# Patient Record
Sex: MALE | URBAN - METROPOLITAN AREA
[De-identification: names, ages, dates, MRNs, and addresses within clinical notes are randomized per-mention and may not be internally consistent; named-entity substitution may affect disease eponyms.]

---

## 2017-10-31 ENCOUNTER — THERAPY VISIT (OUTPATIENT)
Dept: PHYSICAL THERAPY | Facility: CLINIC | Age: 62
End: 2017-10-31
Payer: OTHER MISCELLANEOUS

## 2017-10-31 DIAGNOSIS — M25.512 ACUTE PAIN OF LEFT SHOULDER: Primary | ICD-10-CM

## 2017-10-31 PROCEDURE — 97161 PT EVAL LOW COMPLEX 20 MIN: CPT | Mod: GP | Performed by: PHYSICAL THERAPIST

## 2017-10-31 PROCEDURE — 97110 THERAPEUTIC EXERCISES: CPT | Mod: GP | Performed by: PHYSICAL THERAPIST

## 2017-10-31 NOTE — PROGRESS NOTES
[facility] Initial Evaluation    Subjective:    Patient is a 62 year old male presenting with rehab left shoulder hpi. The history is provided by the patient. No  was used.   Emiliano Cotter is a 62 year old male with a left shoulder (right handed) condition.  Condition occurred with:  Lifting.  Condition occurred: at work.  This is a new condition  Patient reports that he was lifting a 100# object that was 8 feet long and he was trying to leverage the farther away end with his arm and had onset of left shoulder pain. This happened on 10-4-17..    Patient reports pain:  Lateral and anterior.    Pain is described as sharp and aching and is constant and reported as 2/10 (up to 10/10 pain when gets a sharp pain).   Pain is the same all the time.  Symptoms are exacerbated by using arm overhead, using arm behind back, using arm at shoulder level, lifting and lying on extremity (2/10 pain to put shirt on over his head, 5/10 to first shelf of cupboard ) and relieved by rest and NSAID's.  Since onset symptoms are gradually improving.  Special tests:  X-ray (neg per patient).      General health as reported by patient is good.  Pertinent medical history includes:  Asthma.  Medical allergies: no.  Other surgeries include:  None reported.  Current medications:  None as reported by the patient.  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  Patient is working in normal job with restrictions.  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    Barriers include:  None as reported by the patient.    Red flags:  None as reported by the patient.                        Objective:          Flexibility/Screens:   Negative screens: Cervical                              Shoulder Evaluation:  ROM:  AROM:    Flexion:  Left:  150 with ache from 90 and above    Right:  155    Abduction:  Left: WNL with ache from 70 and above                     Extension/Internal Rotation:   Left:  T9 SP with end range ache    Right:  T 9 SP          Strength:    Flexion: Left:5/5    Pain: -    Right: 5/5      Pain:  -  Extension:  Left: 5/5     Pain:-    Right: 5/5     Pain:-  Abduction:  Left: 4-/5   Pain:++    Right: 5/5      Pain:-  Adduction:  Left: 5/5     Pain:-    Right: 5/5      Pain:-  Internal Rotation:  Left:/5     Pain:-    Right: 5/5      Pain:-  External Rotation:   Left:4+/5      Pain:+   Right:5/5      Pain:-      Horizontal Adduction:  Right:/5     Pain:-  Elbow Flexion:  Left:5/5      Pain:-    Right:/5     Pain:-  Elbow Extension:  Left:5/5      Pain:-    Right:5/5      Pain:-  Stability Testing:  normal      Special Tests:    Left shoulder positive for the following special tests:  Impingement  Left shoulder negative for the following special tests:  Rotator cuff tear and Acromioclavicular                                         General     ROS    Assessment/Plan:      Patient is a 62 year old male with left side shoulder complaints.    Patient has the following significant findings with corresponding treatment plan.                Diagnosis 1:  Shoulder strain/impingement  Pain -  manual therapy, self management, education, directional preference exercise and home program  Decreased joint mobility - manual therapy, therapeutic exercise and home program  Decreased strength - therapeutic exercise, therapeutic activities and home program  Impaired muscle performance - neuro re-education and home program  Decreased function - therapeutic activities and home program    Therapy Evaluation Codes:   1) History comprised of:   Personal factors that impact the plan of care:      None.    Comorbidity factors that impact the plan of care are:      None.     Medications impacting care: None.  2) Examination of Body Systems comprised of:   Body structures and functions that impact the plan of care:      Shoulder.   Activity limitations that impact the plan of care are:      Lifting, Laying down  and reaching.  3) Clinical presentation characteristics are:   Stable/Uncomplicated.  4) Decision-Making    Moderate complexity using standardized patient assessment instrument and/or measureable assessment of functional outcome.  Cumulative Therapy Evaluation is: Low complexity.    Previous and current functional limitations:  (See Goal Flow Sheet for this information)    Short term and Long term goals: (See Goal Flow Sheet for this information)     Communication ability:  Patient appears to be able to clearly communicate and understand verbal and written communication and follow directions correctly.  Treatment Explanation - The following has been discussed with the patient:   RX ordered/plan of care  Anticipated outcomes  Possible risks and side effects  This patient would benefit from PT intervention to resume normal activities.   Rehab potential is good.    Frequency:  1-2 X week, once daily  Duration:  for 6-8 weeks  Discharge Plan:  Achieve all LTG.  Independent in home treatment program.  Reach maximal therapeutic benefit.    Please refer to the daily flowsheet for treatment today, total treatment time and time spent performing 1:1 timed codes.

## 2017-10-31 NOTE — LETTER
[facility]  [address]  [place] [id number] [phone]    November 3, 2017    Re: Emiliano Cotter   :   1955  MRN:  [id number]   REFERRING PHYSICIAN:   Yohan Moss    [facility]    Date of Initial Evaluation:  10/31/2017  Visits:  Rxs Used: 1  Reason for Referral:  Acute pain of left shoulder    EVALUATION SUMMARY    [facility]    Subjective:  Patient is a 62 year old male presenting with rehab left shoulder hpi. The history is provided by the patient. No  was used.   Emiliano Cotter is a 62 year old male with a left shoulder (right handed) condition.  Condition occurred with:  Lifting.  Condition occurred: at work.  This is a new condition  Patient reports that he was lifting a 100# object that was 8 feet long and he was trying to leverage the farther away end with his arm and had onset of left shoulder pain. This happened on 10-4-17..    Patient reports pain:  Lateral and anterior.    Pain is described as sharp and aching and is constant and reported as 2/10 (up to 10/10 pain when gets a sharp pain).   Pain is the same all the time.  Symptoms are exacerbated by using arm overhead, using arm behind back, using arm at shoulder level, lifting and lying on extremity (2/10 pain to put shirt on over his head, 5/10 to first shelf of cupboard ) and relieved by rest and NSAID's. Since onset symptoms are gradually improving.  Special tests:  X-ray (neg per patient).  General health as reported by patient is good.  Pertinent medical history includes:  Asthma.  Medical allergies: no.  Other surgeries include:  None reported.  Current medications:  None as reported by the patient. 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  Barriers include:  None as reported by the patient.  Red flags:  None as reported by the patient.    Objective:  Flexibility/Screens:   Negative screens: Cervical        Shoulder Evaluation:  Flexion:  Left:  150 with ache from 90 and above    Right:  155  Abduction:  Left: WNL with ache from 70 and above     Extension/Internal Rotation:  Left:  T9 SP with end range ache    Right:  T 9 SP      Flexion: Left:5/5    Pain: -    Right: 5/5      Pain:  -  Extension:  Left: 5/5     Pain:-    Right: 5/5     Pain:-  Re: Emiliano Cotter   :   1955      Abduction:  Left: 4-/5   Pain:++    Right: 5/5      Pain:-  Adduction:  Left: 5/5     Pain:-    Right: 5/5      Pain:-  Internal Rotation:  Left:/5     Pain:-    Right: 5/5      Pain:-  External Rotation:   Left:4+/5      Pain:+   Right:5/5      Pain:-      Horizontal Adduction:  Right:/5     Pain:-  Elbow Flexion:  Left:5/5      Pain:-    Right:/5     Pain:-  Elbow Extension:  Left:5/5      Pain:-    Right:5/5      Pain:-  Stability Testing:  normal  Left shoulder positive for the following special tests:  Impingement  Left shoulder negative for the following special tests:  Rotator cuff tear and Acromioclavicular    Assessment/Plan:    Patient is a 62 year old male with left side shoulder complaints.    Patient has the following significant findings with corresponding treatment plan.                Diagnosis 1:  Shoulder strain/impingement  Pain -  manual therapy, self management, education, directional preference exercise and home program  Decreased joint mobility - manual therapy, therapeutic exercise and home program  Decreased strength - therapeutic exercise, therapeutic activities and home program  Impaired muscle performance - neuro re-education and home program  Decreased function - therapeutic activities and home program    Therapy Evaluation Codes:   1) History comprised of:   Personal factors that impact the plan of care:      None.     Comorbidity factors that impact the plan of care are:      None.     Medications impacting care: None.  2) Examination of Body Systems comprised of:   Body structures and functions that impact the plan of care:      Shoulder.   Activity limitations that impact the plan of care are:      Lifting, Laying down and reaching.  3) Clinical presentation characteristics are:   Stable/Uncomplicated.  4) Decision-Making    Moderate complexity using standardized patient assessment instrument and/or measureable assessment of functional outcome.  Cumulative Therapy Evaluation is: Low complexity.    Previous and current functional limitations:  (See Goal Flow Sheet for this information)    Short term and Long term goals: (See Goal Flow Sheet for this information)     Communication ability:  Patient appears to be able to clearly communicate and understand verbal and written communication and follow directions correctly.  Treatment Explanation - The following has been discussed with the patient:   RX ordered/plan of care  Re: Emiliano Cotter   : 1955          Anticipated outcomes  Possible risks and side effects  This patient would benefit from PT intervention to resume normal activities.   Rehab potential is good.    Frequency:  1-2 X week, once daily  Duration:  for 6-8 weeks  Discharge Plan:  Achieve all LTG.  Independent in home treatment program.  Reach maximal therapeutic benefit.    Thank you for your referral.        INQUIRIES  Therapist: Ekta Esquivel, PT   [facility]ICAL [facility]ER[facility]  [address] [place] [address]. [address]  [facility] [address]  Phone: [phone]  Fax: [phone]

## 2017-10-31 NOTE — MR AVS SNAPSHOT
After Visit Summary   10/31/2017    Emiliano Cotter    MRN: [id number]           Patient Information     Date Of Birth          1955        Visit Information        Provider Department      10/31/2017 2:20 PM Ekta Esquivel, PT [facility]        Today's Diagnoses     Acute pain of left shoulder    -  1       Follow-ups after your visit        Your next 10 appointments already scheduled     Nov 07, 2017  3:00 PM CST   SHOAIB Extremity with Ekta Esquivel PT   [facility] ([facility])    [address]  [facility] [address]   [phone]            Nov 09, 2017  6:30 AM CST   SHOAIB Extremity with Ekta Esquivel PT   [facility] ([facility])    [address]  [facility] [address]   [phone]            Nov 20, 2017  3:00 PM CST   SHOAIB Extremity with Ekta Esquivel PT   [facility] ([facility])    [address]  [facility] [address]   [phone]              Who to contact     If you have questions or need follow up information about today's clinic visit or your schedule please contact [facility] THERAPY directly at [phone].  Normal or non-critical lab and imaging results will be communicated to you by MyChart, letter or phone within 4 business days after the clinic has received the results. If you do not hear from us within 7 days, please contact the clinic through MyChart or phone. If you have a critical or abnormal lab result, we will notify you by phone as soon as possible.  Submit refill requests through fypio or call your pharmacy and they will forward the refill request to us. Please allow 3 business days for your refill to be completed.          Additional Information  "About Your Visit        MyChart Information     MMIT lets you send messages to your doctor, view your test results, renew your prescriptions, schedule appointments and more. To sign up, go to [email or web address] . Click on \"Log in\" on the left side of the screen, which will take you to the Welcome page. Then click on \"Sign up Now\" on the right side of the page.     You will be asked to enter the access code listed below, as well as some personal information. Please follow the directions to create your username and password.     Your access code is: [email or web address]  Expires: 2018 10:25 AM     Your access code will  in 90 days. If you need help or a new code, please call your [facility] clinic or [phone].        Care EveryWhere ID     This is your Care EveryWhere ID. This could be used by other organizations to access your [facility] medical records  [id number]         Blood Pressure from Last 3 Encounters:   No data found for BP    Weight from Last 3 Encounters:   No data found for Wt              We Performed the Following     SHOAIB Inital Eval Report     PT Eval, Low Complexity (87013)     Therapeutic Exercises        Primary Care Provider Office Phone # Fax #    Yohan Moss [phone]       [address] [address]        Equal Access to Services     CONCEPCIÓN RIZVI : Hadii aad ku hadasho Soomaali, waaxda luqadaha, qaybta kaalmada adeegyada, briana amaya . So [facility] [phone].    ATENCIÓN: Si habla español, tiene a hernandez disposición servicios gratuitos de asistencia lingüística. Llame al [phone].    We comply with applicable federal civil rights laws and [place] laws. We do not discriminate on the basis of race, color, national origin, age, disability, sex, sexual orientation, or gender identity.            Thank you!     Thank you for choosing [facility] PHYSICAL [facility]  for your care. Our goal " is always to provide you with excellent care. Hearing back from our patients is one way we can continue to improve our services. Please take a few minutes to complete the written survey that you may receive in the mail after your visit with us. Thank you!             Your Updated Medication List - Protect others around you: Learn how to safely use, store and throw away your medicines at www.disposemymeds.org.      Notice  As of 10/31/2017 11:59 PM    You have not been prescribed any medications.

## 2017-11-03 PROBLEM — M25.512 ACUTE PAIN OF LEFT SHOULDER: Status: ACTIVE | Noted: 2017-11-03

## 2017-11-07 ENCOUNTER — THERAPY VISIT (OUTPATIENT)
Dept: PHYSICAL THERAPY | Facility: CLINIC | Age: 62
End: 2017-11-07
Payer: OTHER MISCELLANEOUS

## 2017-11-07 DIAGNOSIS — M25.512 ACUTE PAIN OF LEFT SHOULDER: ICD-10-CM

## 2017-11-07 PROCEDURE — 97110 THERAPEUTIC EXERCISES: CPT | Mod: GP | Performed by: PHYSICAL THERAPIST

## 2017-11-07 PROCEDURE — 97112 NEUROMUSCULAR REEDUCATION: CPT | Mod: GP | Performed by: PHYSICAL THERAPIST

## 2017-11-07 NOTE — MR AVS SNAPSHOT
"              After Visit Summary   11/7/2017    Emiliano Cotter    MRN: [id number]           Patient Information     Date Of Birth          1955        Visit Information        Provider Department      11/7/2017 3:00 PM Ekta Esquivel, PT [facility]        Today's Diagnoses     Acute pain of left shoulder           Follow-ups after your visit        Your next 10 appointments already scheduled     Nov 09, 2017  6:30 AM CST   SHOAIB Extremity with Ekta Esquivel PT   [facility] ([facility])    [address]  [facility] [id number] [phone]            Nov 20, 2017  3:00 PM CST   SHOAIB Extremity with Ekta Esquivel PT   [facility] ([facility])    [address]  [facility] [address] [phone]              Who to contact     If you have questions or need follow up information about today's clinic visit or your schedule please contact [facility] directly at [phone].  Normal or non-critical lab and imaging results will be communicated to you by MyChart, letter or phone within 4 business days after the clinic has received the results. If you do not hear from us within 7 days, please contact the clinic through Stupeflixhart or phone. If you have a critical or abnormal lab result, we will notify you by phone as soon as possible.  Submit refill requests through twtrland or call your pharmacy and they will forward the refill request to us. Please allow 3 business days for your refill to be completed.          Additional Information About Your Visit        MyChart Information     twtrland lets you send messages to your doctor, view your test results, renew your prescriptions, schedule appointments and more. To sign up, go to [email or web address] . Click on \"Log in\" " "on the left side of the screen, which will take you to the Welcome page. Then click on \"Sign up Now\" on the right side of the page.     You will be asked to enter the access code listed below, as well as some personal information. Please follow the directions to create your username and password.     Your access code is: [email or web address]  Expires: 2018  9:25 AM     Your access code will  in 90 days. If you need help or a new code, please call your [facility] or [phone].        Care EveryWhere ID     This is your Care EveryWhere ID. This could be used by other organizations to access your [facility] medical records  [id number]         Blood Pressure from Last 3 Encounters:   No data found for BP    Weight from Last 3 Encounters:   No data found for Wt              We Performed the Following     Neuromuscular Re-Education     Therapeutic Exercises        Primary Care Provider Office Phone # Fax #    Yohan Moss [phone]       [address] [address]        Equal Access to Services     CROW RIZVI : Hadii aad ku hadasho Soomaali, waaxda luqadaha, qaybta kaalmada adeegyada, briana alford haybereket amaya . So [facility] [phone].    ATENCIÓN: Si habla español, tiene a hernandez disposición servicios gratuitos de asistencia lingüística. Llame al [phone].    We comply with applicable federal civil rights laws and [place] laws. We do not discriminate on the basis of race, color, national origin, age, disability, sex, sexual orientation, or gender identity.            Thank you!     Thank you for choosing [facility]  for your care. Our goal is always to provide you with excellent care. Hearing back from our patients is one way we can continue to improve our services. Please take a few minutes to complete the written survey that you may receive in the mail after your visit with us. Thank you!             Your " Updated Medication List - Protect others around you: Learn how to safely use, store and throw away your medicines at www.disposemymeds.org.      Notice  As of 11/7/2017  4:22 PM    You have not been prescribed any medications.

## 2017-11-07 NOTE — PROGRESS NOTES
Subjective:    HPI                    Objective:    System    Physical Exam    General     ROS    Assessment/Plan:      SUBJECTIVE  Subjective changes as noted by pt:  Patient reports that he was feeling quite a bit better, but then he tweaked his shoulder ar work today when he had to pull it back quickly from a position he was working on when his arm was at about shoulder level.      Current Pain level: 3/10   Changes in function:  Yes (See Goal flowsheet attached for changes in current functional level)     Adverse reaction to treatment or activity:  None    OBJECTIVE  Changes in objective findings:  L shoulder AROM in standing: flexion WNL with end range tightness and aching, abduction WNL with end range tightness and pain from about 90 degrees to end range, and IR/EXT WNL and no pain. No change in resisted L shoulder testing.         ASSESSMENT  Emiliano continues to require intervention to meet STG and LTG's: PT  Patient is progressing as expected.  Response to therapy has shown an improvement in  pain level and function  Progress made towards STG/LTG?  Yes (See Goal flowsheet attached for updates on achievement of STG and LTG)    PLAN  Current treatment program is being advanced to more complex exercises.    PTA/ATC plan:  N/A    Please refer to the daily flowsheet for treatment today, total treatment time and time spent performing 1:1 timed codes.

## 2017-11-09 ENCOUNTER — THERAPY VISIT (OUTPATIENT)
Dept: PHYSICAL THERAPY | Facility: CLINIC | Age: 62
End: 2017-11-09
Payer: OTHER MISCELLANEOUS

## 2017-11-09 DIAGNOSIS — M25.512 ACUTE PAIN OF LEFT SHOULDER: ICD-10-CM

## 2017-11-09 PROCEDURE — 97110 THERAPEUTIC EXERCISES: CPT | Mod: GP | Performed by: PHYSICAL THERAPIST

## 2017-11-09 PROCEDURE — 97112 NEUROMUSCULAR REEDUCATION: CPT | Mod: GP | Performed by: PHYSICAL THERAPIST

## 2017-11-20 ENCOUNTER — THERAPY VISIT (OUTPATIENT)
Dept: PHYSICAL THERAPY | Facility: CLINIC | Age: 62
End: 2017-11-20
Payer: OTHER MISCELLANEOUS

## 2017-11-20 DIAGNOSIS — M25.512 ACUTE PAIN OF LEFT SHOULDER: ICD-10-CM

## 2017-11-20 PROCEDURE — 97112 NEUROMUSCULAR REEDUCATION: CPT | Mod: GP | Performed by: PHYSICAL THERAPIST

## 2017-11-20 PROCEDURE — 97110 THERAPEUTIC EXERCISES: CPT | Mod: GP | Performed by: PHYSICAL THERAPIST

## 2017-11-20 NOTE — PROGRESS NOTES
Subjective:    HPI                    Objective:    System    Physical Exam    General     ROS    Assessment/Plan:      PROGRESS  REPORT    Progress reporting period is from 10-31-17 to 11-20-17.       SUBJECTIVE  Subjective changes noted by patient:  Patient reports 95% improvement since starting therapy. He has done some lifting of 30# from an upper shelf at work and has had no pain.        Current Pain level: 0/10.     Previous pain level was  2/10  .   Changes in function:  Yes (See Goal flowsheet attached for changes in current functional level)  Adverse reaction to treatment or activity: None    OBJECTIVE  Changes noted in objective findings:  Standing L shoulder AROM all WNL with end range tightness on full elevation and no pain with movements. Left shoulder resisted testing all strong and painfree, except with 5-/5 and very slight ache with resisted L shoulder abduction.         ASSESSMENT/PLAN  Updated problem list and treatment plan: Diagnosis 1:  L shoulder strain/impingement  Pain -  manual therapy, self management, education, directional preference exercise and home program  Decreased ROM/flexibility - manual therapy, therapeutic exercise and home program  Decreased strength - therapeutic exercise, therapeutic activities and home program  Impaired muscle performance - neuro re-education and home program  Decreased function - therapeutic activities and home program  STG/LTGs have been met or progress has been made towards goals:  Yes (See Goal flow sheet completed today.)  Assessment of Progress: The patient's condition is improving.  The patient has met all of their long term goals.  Self Management Plans:  Patient is independent in a home treatment program.  Patient is independent in self management of symptoms.    Emiliano continues to require the following intervention to meet STG and LTG's:  PT intervention is no longer required to meet STG/LTG.    Recommendations:  This patient is ready to be  discharged from therapy and continue their home treatment program.    Please refer to the daily flowsheet for treatment today, total treatment time and time spent performing 1:1 timed codes.

## 2017-11-20 NOTE — MR AVS SNAPSHOT
"              After Visit Summary   2017    Emiliano Cotter    MRN: [id number]           Patient Information     Date Of Birth          1955        Visit Information        Provider Department      2017 3:00 PM Ekta Esquivel PT [facility] Physical Therapy        Today's Diagnoses     Acute pain of left shoulder           Follow-ups after your visit        Who to contact     If you have questions or need follow up information about today's clinic visit or your schedule please contact [facility] directly at [phone].  Normal or non-critical lab and imaging results will be communicated to you by [employer]et[employer], letter or phone within 4 business days after the clinic has received the results. If you do not hear from us within 7 days, please contact the clinic through [employer]abet[employer] or phone. If you have a critical or abnormal lab result, we will notify you by phone as soon as possible.  Submit refill requests through [employer] or call your pharmacy and they will forward the refill request to us. Please allow 3 business days for your refill to be completed.          Additional Information About Your Visit        MyChart Information     [employer] lets you send messages to your doctor, view your test results, renew your prescriptions, schedule appointments and more. To sign up, go to [email or web address] . Click on \"Log in\" on the left side of the screen, which will take you to the Welcome page. Then click on \"Sign up Now\" on the right side of the page.     You will be asked to enter the access code listed below, as well as some personal information. Please follow the directions to create your username and password.     Your access code is: [email or web address]  Expires: 2018  9:25 AM     Your access code will  in 90 days. If you need help or a new code, please call your [place] clinic or [phone].        Care EveryWhere " ID     This is your Care EveryWhere ID. This could be used by other organizations to access your [place] medical records  [id number]         Blood Pressure from Last 3 Encounters:   No data found for BP    Weight from Last 3 Encounters:   No data found for Wt              We Performed the Following     SHOAIB Progress Notes Report     Neuromuscular Re-Education     Therapeutic Exercises        Primary Care Provider Office Phone # Fax #    Yohan Moss [phone]       [address] [address]        Equal Access to Services     CONCEPCIÓN RIZVI : Hadii aad ku hadasho Soomaali, waaxda luqadaha, qaybta kaalmada adeegyada, waxay idiin hayaan adeeg kharash la'bereket . So [facility] [phone].    ATENCIÓN: Si habla español, tiene a hernandez disposición servicios gratuitos de asistencia lingüística. [facility] [phone].    We comply with applicable federal civil rights laws and [place] laws. We do not discriminate on the basis of race, color, national origin, age, disability, sex, sexual orientation, or gender identity.            Thank you!     Thank you for choosing [facility]  for your care. Our goal is always to provide you with excellent care. Hearing back from our patients is one way we can continue to improve our services. Please take a few minutes to complete the written survey that you may receive in the mail after your visit with us. Thank you!             Your Updated Medication List - Protect others around you: Learn how to safely use, store and throw away your medicines at www.disposemymeds.org.      Notice  As of 11/20/2017 11:59 PM    You have not been prescribed any medications.

## 2018-10-16 ENCOUNTER — THERAPY VISIT (OUTPATIENT)
Dept: PHYSICAL THERAPY | Facility: CLINIC | Age: 63
End: 2018-10-16
Payer: OTHER MISCELLANEOUS

## 2018-10-16 DIAGNOSIS — M25.512 SHOULDER PAIN, LEFT: Primary | ICD-10-CM

## 2018-10-16 PROCEDURE — 97162 PT EVAL MOD COMPLEX 30 MIN: CPT | Mod: GP | Performed by: PHYSICAL THERAPIST

## 2018-10-16 PROCEDURE — 97110 THERAPEUTIC EXERCISES: CPT | Mod: GP | Performed by: PHYSICAL THERAPIST

## 2018-10-16 NOTE — PROGRESS NOTES
[place] [facility] Initial Evaluation  Subjective:  Patient is a 62 year old male presenting with rehab left shoulder hpi. The history is provided by the patient. No  was used.   Emiliano Cotter is a 62 year old male with a left shoulder (right handed) condition.  Condition occurred with:  Contact with object.  Condition occurred: at work.  This is a new condition  Patient reports that on 7-7-18, he was at work and a heat exchanger on a hoist was swinging around and he put his left arm out to stop it and he had immediate onset of left shoulder pain. He continued to work with pain to get a project out. He went to the doctor in September of 2018 and had a MRI last week which he states shows some RCT. He had a similar pain in the left shoulder from lifting at work which resolved with therapy. MD order from 10-15-18..    Patient reports pain:  Upper arm.    Pain is described as aching, sharp and shooting and is constant (constant ache and intermitent sharp) and reported as 3/10 (up to 9/10 pain when bad).  Associated symptoms:  Loss of motion/stiffness. Pain is the same all the time (pain based more on activity).  Exacerbated by: 7/10 pain to put shirt on over his head, 4/10 pain to open the door, can't drive his boat,  4/10 pain to reach first shelf of cupboard, 3/10 pain to tuck shirt in behind his back, 3/10 pain to keyboard. and relieved by NSAID's, heat and rest.  Since onset symptoms are unchanged.  Special tests:  MRI (tendinopathy long head of biceps, partail tear of infraspin and partial chronic tear of supra and labrum, OA of GH and AC joints).      General health as reported by patient is excellent.  Pertinent medical history includes:  Asthma.  Medical allergies: no.  Other surgeries include:  Other (appendectomy, cyst).  Current medications:  Other (flow vent).  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  Patient is currently not working due to present treatment problem.  Primary  job tasks include:  Lifting, prolonged standing, other and repetitive tasks (pushing/pulling, arrying).    Barriers include:  None as reported by the patient.    Red flags:  None as reported by the patient.                        Objective:  System                   Shoulder Evaluation:  ROM:  AROM:    Flexion:  Left:  135 with ache ffrom 80 and above    Right:  150    Abduction:  Left: 122 with pain from 40 and above   Right:  148                  Extension/Internal Rotation:  Left:  L 2 SP with ache    Right:  T 10 SP          Strength:    Flexion: Left:5/5    Pain: -    Right: 5/5      Pain:  -  Extension:  Left: 5/5     Pain:-    Right: 5/5     Pain:-  Abduction:  Left: 4-/5   Pain:++    Right: 5/5      Pain:-  Adduction:  Left: 5/5     Pain:-    Right:/5   Strong/pain free    Pain:-  Internal Rotation:  Left:4+/5      Pain:+    Right: 5/5      Pain:-  External Rotation:   Left:4+/5      Pain:-   Right:5/5      Pain:-      Horizontal Adduction:  Right:/5     Pain:-  Elbow Flexion:  Left:4+/5      Pain:+    Right:5/5      Pain:-  Elbow Extension:  Left:5/5      Pain:-    Right:5/5      Pain:-  Stability Testing:  normal      Special Tests:    Left shoulder positive for the following special tests:  Impingement    Palpation:  not assessed                                         General     ROS    Assessment/Plan:    Patient is a 62 year old male with left side shoulder complaints.    Patient has the following significant findings with corresponding treatment plan.                Diagnosis 1:  Shoulder pain/impingement  Pain -  hot/cold therapy, manual therapy, self management, education, directional preference exercise and home program  Decreased ROM/flexibility - manual therapy, therapeutic exercise and home program  Decreased strength - therapeutic exercise, therapeutic activities and home program  Impaired muscle performance - neuro re-education and home program  Decreased function - therapeutic activities and  home program    Therapy Evaluation Codes:   1) History comprised of:   Personal factors that impact the plan of care:      Past/current experiences.    Comorbidity factors that impact the plan of care are:      None.     Medications impacting care: None.  2) Examination of Body Systems comprised of:   Body structures and functions that impact the plan of care:      Shoulder.   Activity limitations that impact the plan of care are:      Bathing, Driving, Dressing, Lifting, Working, Sleeping, Laying down and reaching.  3) Clinical presentation characteristics are:   Evolving/Changing.  4) Decision-Making    Moderate complexity using standardized patient assessment instrument and/or measureable assessment of functional outcome.  Cumulative Therapy Evaluation is: Moderate complexity.    Previous and current functional limitations:  (See Goal Flow Sheet for this information)    Short term and Long term goals: (See Goal Flow Sheet for this information)     Communication ability:  Patient appears to be able to clearly communicate and understand verbal and written communication and follow directions correctly.  Treatment Explanation - The following has been discussed with the patient:   RX ordered/plan of care  Anticipated outcomes  Possible risks and side effects  This patient would benefit from PT intervention to resume normal activities.   Rehab potential is good.    Frequency:  1 X week, once daily  Duration:  for 10 weeks  Discharge Plan:  Achieve all LTG.  Independent in home treatment program.  Reach maximal therapeutic benefit.    Please refer to the daily flowsheet for treatment today, total treatment time and time spent performing 1:1 timed codes.

## 2018-10-16 NOTE — LETTER
[facility]  [address]  [facility] [address]  [phone]    2018    Re: Emiliano Cotter   :   1955  MRN:  [id number]   REFERRING PHYSICIAN:   Yohan Moss  [facility]  Date of Initial Evaluation:  10/16/2018  Visits:  Rxs Used: 1  Reason for Referral:  Shoulder pain, left    EVALUATION SUMMARY  [facility] Evaluation  Subjective:  Patient is a 62 year old male presenting with rehab left shoulder hpi. The history is provided by the patient. No  was used.   Emiliano Cotter is a 62 year old male with a left shoulder (right handed) condition.  Condition occurred with:  Contact with object.  Condition occurred: at work.  This is a new condition  Patient reports that on 18, he was at work and a heat exchanger on a hoist was swinging around and he put his left arm out to stop it and he had immediate onset of left shoulder pain. He continued to work with pain to get a project out. He went to the doctor in 2018 and had a MRI last week which he states shows some RCT. He had a similar pain in the left shoulder from lifting at work which resolved with therapy. MD order from 10-15-18..    Patient reports pain:  Upper arm.    Pain is described as aching, sharp and shooting and is constant (constant ache and intermitent sharp) and reported as 3/10 (up to 9/10 pain when bad).  Associated symptoms:  Loss of motion/stiffness. Pain is the same all the time (pain based more on activity).  Exacerbated by: 7/10 pain to put shirt on over his head, 4/10 pain to open the door, can't drive his boat,  4/10 pain to reach first shelf of cupboard, 3/10 pain to tuck shirt in behind his back, 3/10 pain to keyboard. and relieved by NSAID's, heat and rest.  Since onset symptoms are unchanged.  Special tests:  MRI (tendinopathy long head of biceps, partail tear of infraspin and  partial chronic tear of supra and labrum, OA of GH and AC joints).      General health as reported by patient is excellent.  Pertinent medical history includes:  Asthma.  Medical allergies: no.  Other surgeries include:  Other (appendectomy, cyst).  Current medications:  Other (flow vent).  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  Patient is currently not working due to present treatment problem.  Primary job tasks include:  Lifting, prolonged standing, other and repetitive tasks (pushing/pulling, arrying).  Barriers include:  None as reported by the patient.  Red flags:  None as reported by the patient.  Objective:  System       Shoulder Evaluation:  ROM:  AROM:    Flexion:  Left:  135 with ache ffrom 80 and above    Right:  150  Abduction:  Left: 122 with pain from 40 and above   Right:  148  Extension/Internal Rotation:  Left:  L 2 SP with ache    Right:  T 10 SP      Strength:    Flexion: Left:5/5    Pain: -    Right: 5/5      Pain:  -  Extension:  Left: 5/5     Pain:-    Right: 5/5     Pain:-  Abduction:  Left: 4-/5   Pain:++    Right: 5/5      Pain:-  Adduction:  Left: 5/5     Pain:-    Right:/5   Strong/pain free    Pain:-  Internal Rotation:  Left:4+/5      Pain:+    Right: 5/5      Pain:-  External Rotation:   Left:4+/5      Pain:-   Right:5/5      Pain:-      Horizontal Adduction:  Right:/5     Pain:-  Elbow Flexion:  Left:4+/5      Pain:+    Right:5/5      Pain:-  Elbow Extension:  Left:5/5      Pain:-    Right:5/5      Pain:-  Stability Testing:  normal    Special Tests:    Left shoulder positive for the following special tests:  Impingement  Palpation:  not assessed    Assessment/Plan:    Patient is a 62 year old male with left side shoulder complaints.    Patient has the following significant findings with corresponding treatment plan.                Diagnosis 1:  Shoulder pain/impingement  Pain -  hot/cold therapy, manual therapy, self management, education, directional preference exercise and home  program  Decreased ROM/flexibility - manual therapy, therapeutic exercise and home program  Decreased strength - therapeutic exercise, therapeutic activities and home program  Impaired muscle performance - neuro re-education and home program  Decreased function - therapeutic activities and home program    Therapy Evaluation Codes:   1) History comprised of:   Personal factors that impact the plan of care:      Past/current experiences.    Comorbidity factors that impact the plan of care are:      None.     Medications impacting care: None.  2) Examination of Body Systems comprised of:   Body structures and functions that impact the plan of care:      Shoulder.   Activity limitations that impact the plan of care are:      Bathing, Driving, Dressing, Lifting, Working, Sleeping, Laying down and reaching.  3) Clinical presentation characteristics are:   Evolving/Changing.  4) Decision-Making    Moderate complexity using standardized patient assessment instrument and/or measureable assessment of functional outcome.  Cumulative Therapy Evaluation is: Moderate complexity.    Previous and current functional limitations:  (See Goal Flow Sheet for this information)    Short term and Long term goals: (See Goal Flow Sheet for this information)     Communication ability:  Patient appears to be able to clearly communicate and understand verbal and written communication and follow directions correctly.  Treatment Explanation - The following has been discussed with the patient:   RX ordered/plan of care  Anticipated outcomes  Possible risks and side effects  This patient would benefit from PT intervention to resume normal activities.   Rehab potential is good.    Frequency:  1 X week, once daily  Duration:  for 10 weeks  Discharge Plan:  Achieve all LTG.  Independent in home treatment program.  Reach maximal therapeutic benefit.    Please refer to the daily flowsheet for treatment today, total treatment time and time spent  performing 1:1 timed codes.     Thank you for your referral.    INQUIRIES  Therapist:Ekta Esquivel PT  [facility]  [address]  [facility] [address]  Phone: [phone]  Fax: [phone]

## 2018-10-16 NOTE — MR AVS SNAPSHOT
After Visit Summary   10/16/2018    Emiliano Cotter    MRN: [id number]           Patient Information     Date Of Birth          1955        Visit Information        Provider Department      10/16/2018 8:50 AM Ekta Esquivel, PT [facility]        Today's Diagnoses     Shoulder pain, left    -  1       Follow-ups after your visit        Your next 10 appointments already scheduled     Oct 23, 2018  8:10 AM CDT   [facility]OA[facility] Extremity with Ekta Esquivel PT   [facility] ([facility] [place]  )    [address] [facility] [address]   [phone]            Oct 30, 2018  8:10 AM CDT   [facility] Extremity with Ekta Esquivel PT   [facility] ([facility] [place]  )    [address] [facility] [address]   [phone]            Nov 06, 2018  8:10 AM CST   [facility] Extremity with Ekta Esquivel PT   [facility] ([facility] [place]  )    [address] [facility] [address]   [phone]            Nov 13, 2018  8:10 AM CST   [facility] Extremity with Ekta Esquivel PT   [facility] ([facility] [place]  )    [address] [facility] [address]   [phone]              Who to contact     If you have questions or need follow up information about today's clinic visit or your schedule please contact [facility] directly at [phone].  Normal or non-critical lab and imaging results will be communicated to you by MyChart, letter or phone within 4 business days after the clinic has received the results. If you do not hear from us within 7 days, please contact the clinic through MyChart or phone. If you have a critical or abnormal lab result, we will notify you by phone as soon as possible.  Submit refill requests through [employer] or call your pharmacy and they will forward the refill  "request to us. Please allow 3 business days for your refill to be completed.          Additional Information About Your Visit        MyChart Information     [employer] lets you send messages to your doctor, view your test results, renew your prescriptions, schedule appointments and more. To sign up, go to [email or web address] . Click on \"Log in\" on the left side of the screen, which will take you to the Welcome page. Then click on \"Sign up Now\" on the right side of the page.     You will be asked to enter the access code listed below, as well as some personal information. Please follow the directions to create your username and password.     Your access code is: [email or web address]  Expires: 2019  6:01 PM     Your access code will  in 90 days. If you need help or a new code, please call your [place] clinic or [phone].        Care EveryWhere ID     This is your Care EveryWhere ID. This could be used by other organizations to access your [place] medical records  [id number]         Blood Pressure from Last 3 Encounters:   No data found for BP    Weight from Last 3 Encounters:   No data found for Wt              We Performed the Following     SHOAIB Inital Eval Report     PT Eval, Moderate Complexity (85244)     Therapeutic Exercises        Primary Care Provider Office Phone # Fax #    Yohan Moss [phone]       [address] [address]        Equal Access to Services     [facility]: Hadii aad ku hadasho Soomaali, waaxda luqadaha, qaybta kaalmada adeegyada, briana alford hayjazieln madai amaya . So [facility] [phone].    ATENCIÓN: Si habla español, tiene a hernandez disposición servicios gratuitos de asistencia lingüística. Llame al [phone].    We comply with applicable federal civil rights laws and [place] laws. We do not discriminate on the basis of race, color, national origin, age, disability, sex, sexual orientation, or gender identity.            Thank you!  "    Thank you for choosing [facility]  for your care. Our goal is always to provide you with excellent care. Hearing back from our patients is one way we can continue to improve our services. Please take a few minutes to complete the written survey that you may receive in the mail after your visit with us. Thank you!             Your Updated Medication List - Protect others around you: Learn how to safely use, store and throw away your medicines at www.disposemymeds.org.      Notice  As of 10/16/2018  6:01 PM    You have not been prescribed any medications.

## 2018-10-23 ENCOUNTER — THERAPY VISIT (OUTPATIENT)
Dept: PHYSICAL THERAPY | Facility: CLINIC | Age: 63
End: 2018-10-23
Payer: OTHER MISCELLANEOUS

## 2018-10-23 DIAGNOSIS — M25.512 SHOULDER PAIN, LEFT: ICD-10-CM

## 2018-10-23 PROCEDURE — 97112 NEUROMUSCULAR REEDUCATION: CPT | Mod: GP | Performed by: PHYSICAL THERAPIST

## 2018-10-23 PROCEDURE — 97110 THERAPEUTIC EXERCISES: CPT | Mod: GP | Performed by: PHYSICAL THERAPIST

## 2018-10-23 NOTE — LETTER
[facility]  [address]  [facility] [address]  [phone]    2018    Re: Emiliano Cotter   :   1955  MRN:  [id number]   REFERRING PHYSICIAN:   Yohan Moss    [facility]    Date of Initial Evaluation:10/16/2018  Visits:  Rxs Used: 2  Reason for Referral:  Shoulder pain, left    EVALUATION SUMMARY  PROGRESS  REPORT  Progress reporting period is from 10-16-18- to 10-23-18.       SUBJECTIVE  Subjective changes noted by patient:  Patient reports that he is feeling better. He is having less clicking and notes improved shoulder ROM. He is taking a lot less ibuprofen.        Current Pain level: 1/10.     Previous pain level was  3/10  .   Changes in function:  Yes (See Goal flowsheet attached for changes in current functional level)  Adverse reaction to treatment or activity: None    OBJECTIVE  Changes noted in objective findings:  Standing L shoulder AROM: flexion with end range tightness and pain from about 140 to end range and abduction to 130 with pain from 90 to end range. Pain and weakness with resisted L shoulder abduction>ER.  ASSESSMENT/PLAN  Updated problem list and treatment plan: Diagnosis 1: shoulder pain/impingement Pain -  hot/cold therapy, manual therapy, self management, education, directional preference exercise and home program  Decreased ROM/flexibility - manual therapy, therapeutic exercise and home program  Decreased joint mobility - manual therapy, therapeutic exercise and home program  Decreased strength - therapeutic exercise, therapeutic activities and home program  Impaired muscle performance - neuro re-education and home program  Decreased function - therapeutic activities and home program  STG/LTGs have been met or progress has been made towards goals:  Yes (See Goal flow sheet completed today.)  Assessment of Progress: The patient's condition is improving.  Patient is meeting short term  goals and is progressing towards long term goals.  Self Management Plans:  Patient has been instructed in a home treatment program.  Patient  has been instructed in self management of symptoms.  Emiliano continues to require the following intervention to meet STG and LTG's:  PT  Recommendations:  This patient would benefit from continued therapy.     Frequency:  1 X week, once daily  Duration:  for 6 weeks    Please refer to the daily flowsheet for treatment today, total treatment time and time spent performing 1:1 timed codes.  Thank you for your referral.    INQUIRIES  Therapist: Ekta Esquivel, PT  [facility]  [address]  [facility] [address]  Phone: [phone]  Fax: [phone]

## 2018-10-23 NOTE — PROGRESS NOTES
Subjective:  HPI                    Objective:  System    Physical Exam    General     ROS    Assessment/Plan:    PROGRESS  REPORT    Progress reporting period is from 10-16-18- to 10-23-18.       SUBJECTIVE  Subjective changes noted by patient:  Patient reports that he is feeling better. He is having less clicking and notes improved shoulder ROM. He is taking a lot less ibuprofen.        Current Pain level: 1/10.     Previous pain level was  3/10  .   Changes in function:  Yes (See Goal flowsheet attached for changes in current functional level)  Adverse reaction to treatment or activity: None    OBJECTIVE  Changes noted in objective findings:  Standing L shoulder AROM: flexion with end range tightness and pain from about 140 to end range and abduction to 130 with pain from 90 to end range. Pain and weakness with resisted L shoulder abduction>ER.        ASSESSMENT/PLAN  Updated problem list and treatment plan: Diagnosis 1: shoulder pain/impingement Pain -  hot/cold therapy, manual therapy, self management, education, directional preference exercise and home program  Decreased ROM/flexibility - manual therapy, therapeutic exercise and home program  Decreased joint mobility - manual therapy, therapeutic exercise and home program  Decreased strength - therapeutic exercise, therapeutic activities and home program  Impaired muscle performance - neuro re-education and home program  Decreased function - therapeutic activities and home program  STG/LTGs have been met or progress has been made towards goals:  Yes (See Goal flow sheet completed today.)  Assessment of Progress: The patient's condition is improving.  Patient is meeting short term goals and is progressing towards long term goals.  Self Management Plans:  Patient has been instructed in a home treatment program.  Patient  has been instructed in self management of symptoms.    Emiliano continues to require the following intervention to meet STG and LTG's:   PT    Recommendations:  This patient would benefit from continued therapy.     Frequency:  1 X week, once daily  Duration:  for 6 weeks        Please refer to the daily flowsheet for treatment today, total treatment time and time spent performing 1:1 timed codes.

## 2018-10-23 NOTE — PROGRESS NOTES
"Subjective:  HPI                    Objective:  System    Physical Exam    General     ROS    Assessment/Plan:    SUBJECTIVE  Subjective changes as noted by pt:        Current Pain level:   Changes in function:  Yes (See Goal flowsheet attached for changes in current functional level)     Adverse reaction to treatment or activity:  None    OBJECTIVE  Changes in objective findings:       ASSESSMENT  Emiliano continues to require intervention to meet STG and LTG's: {INTERVENTION REHAB:099906}  {ASSESSMENT THERAPY:097850}  {Response to PT:965853}  Progress made towards STG/LTG?  {Changes in STG/LT}    PLAN  {Response to PT:253202}    PTA/ATC plan:  {PLAN PTA:442242::\"N/A\"}    Please refer to the daily flowsheet for treatment today, total treatment time and time spent performing 1:1 timed codes.          "

## 2018-10-23 NOTE — MR AVS SNAPSHOT
After Visit Summary   10/23/2018    Emiliano Cotter    MRN: [id number]           Patient Information     Date Of Birth          1955        Visit Information        Provider Department      10/23/2018 8:10 AM Ekta Esquivel, PT [facility]        Today's Diagnoses     Shoulder pain, left           Follow-ups after your visit        Your next 10 appointments already scheduled     Oct 30, 2018  8:10 AM CDT   SHOAIB Extremity with Ekta Esquivel PT   [facility] ([facility] [place]  )    [address]  [facility] [address]   [phone]            Nov 06, 2018  8:10 AM CST   SHOAIB Extremity with Ekta Esquivel PT   [facility] ([facility] [place]  )    [address]  [facility] [address]   [phone]            Nov 13, 2018  8:10 AM CST   SHOAIB Extremity with Ekta Esquivel PT   [facility] ([facility] [place]  )    [address]  [facility] [address]   [phone]              Who to contact     If you have questions or need follow up information about today's clinic visit or your schedule please contact [facility] directly at [phone].  Normal or non-critical lab and imaging results will be communicated to you by TYMRhart, letter or phone within 4 business days after the clinic has received the results. If you do not hear from us within 7 days, please contact the clinic through TYMRhart or phone. If you have a critical or abnormal lab result, we will notify you by phone as soon as possible.  Submit refill requests through [employer] or call your pharmacy and they will forward the refill request to us. Please allow 3 business days for your refill to be completed.          Additional Information About Your Visit        City[employer] Information     [employer] lets you send messages to your doctor, view your test  "results, renew your prescriptions, schedule appointments and more. To sign up, go to [email or web address] . Click on \"Log in\" on the left side of the screen, which will take you to the Welcome page. Then click on \"Sign up Now\" on the right side of the page.     You will be asked to enter the access code listed below, as well as some personal information. Please follow the directions to create your username and password.     Your access code is: [email or web address]  Expires: 2019  6:01 PM     Your access code will  in 90 days. If you need help or a new code, please call your [place] clinic or [phone].        Care EveryWhere ID     This is your Care EveryWhere ID. This could be used by other organizations to access your [place] medical records  [id number]         Blood Pressure from Last 3 Encounters:   No data found for BP    Weight from Last 3 Encounters:   No data found for Wt              We Performed the Following     SHOAIB Progress Notes Report     Neuromuscular Re-Education     Therapeutic Exercises        Primary Care Provider Office Phone # Fax #    Yohan Moss [phone]       [address] [address]        Equal Access to Services     CONCEPCIÓN RIZVI : Hadii dennys ku hadasho Sosongali, waaxda luqadaha, qaybta kaalmada adeegyada, briana amaya . So [facility] [phone].    ATENCIÓN: Si habla español, tiene a hernandez disposición servicios gratuitos de asistencia lingüística. Llame al [phone].    We comply with applicable federal civil rights laws and [place] laws. We do not discriminate on the basis of race, color, national origin, age, disability, sex, sexual orientation, or gender identity.            Thank you!     Thank you for choosing [facility]  for your care. Our goal is always to provide you with excellent care. Hearing back from our patients is one way we can continue to improve our services. " Please take a few minutes to complete the written survey that you may receive in the mail after your visit with us. Thank you!             Your Updated Medication List - Protect others around you: Learn how to safely use, store and throw away your medicines at www.disposemymeds.org.      Notice  As of 10/23/2018  9:47 AM    You have not been prescribed any medications.

## 2018-10-30 ENCOUNTER — THERAPY VISIT (OUTPATIENT)
Dept: PHYSICAL THERAPY | Facility: CLINIC | Age: 63
End: 2018-10-30
Payer: OTHER MISCELLANEOUS

## 2018-10-30 DIAGNOSIS — M25.512 SHOULDER PAIN, LEFT: ICD-10-CM

## 2018-10-30 PROCEDURE — 97110 THERAPEUTIC EXERCISES: CPT | Mod: GP | Performed by: PHYSICAL THERAPIST

## 2018-10-30 PROCEDURE — 97112 NEUROMUSCULAR REEDUCATION: CPT | Mod: GP | Performed by: PHYSICAL THERAPIST

## 2018-10-30 NOTE — PROGRESS NOTES
Subjective:  HPI                    Objective:  System    Physical Exam    General     ROS    Assessment/Plan:    SUBJECTIVE  Subjective changes as noted by pt:  Patient reports that he had been doing well overall until Sunday. He was just sitting at his chair with his arm resting on the armrest and reached back with that arm to get something and felt a lot of clicking and pain into the shoulder. The shoulder has been much more painful since that time. He has improved slowly since Sunday though.      Current Pain level: 2/10   Changes in function:  Yes (See Goal flowsheet attached for changes in current functional level)     Adverse reaction to treatment or activity:  None    OBJECTIVE  Changes in objective findings:  Standing L shoulder AROM: flexion and abduction to 140 with pain from 90 and above. Pain and weakness with resisted abduction>>ER>flexion. Decreased lori and improved ROM following repeated shld extension. Tolerated exercises without increased pain.         ASSESSMENT  Emiliano continues to require intervention to meet STG and LTG's: PT  Patient has experienced an exacerbation of symptoms.  Response to therapy has shown a worsening of  pain level and function  Progress made towards STG/LTG?  Yes (See Goal flowsheet attached for updates on achievement of STG and LTG)    PLAN  Continue current treatment plan until patient demonstrates readiness to progress to higher level exercises.    PTA/ATC plan:  N/A    Please refer to the daily flowsheet for treatment today, total treatment time and time spent performing 1:1 timed codes.

## 2018-10-30 NOTE — MR AVS SNAPSHOT
"              After Visit Summary   10/30/2018    Emiliano Cotter    MRN: [id number]           Patient Information     Date Of Birth          1955        Visit Information        Provider Department      10/30/2018 8:10 AM Ekta Esquivel PT [facility]        Today's Diagnoses     Shoulder pain, left           Follow-ups after your visit        Your next 10 appointments already scheduled     Nov 06, 2018  8:10 AM CST   SHOAIB Extremity with Ekta Esquivel PT   [facility] ([facility]  )    [address]  [facility] [address]   [phone]            Nov 12, 2018  7:00 AM CST   SHOAIB Extremity with Ekta Esquivel PT   [facility] ([facility]  )    [address]  [facility] [id number] [phone]              Who to contact     If you have questions or need follow up information about today's clinic visit or your schedule please contact [facility] directly at [phone].  Normal or non-critical lab and imaging results will be communicated to you by T[employer] Surf Internationalhart, letter or phone within 4 business days after the clinic has received the results. If you do not hear from us within 7 days, please contact the clinic through T[employer] or phone. If you have a critical or abnormal lab result, we will notify you by phone as soon as possible.  Submit refill requests through [employer] or call your pharmacy and they will forward the refill request to us. Please allow 3 business days for your refill to be completed.          Additional Information About Your Visit        [employer] Information     [employer] lets you send messages to your doctor, view your test results, renew your prescriptions, schedule appointments and more. To sign up, go to [email or web address] . Click on \"Log in\" on the left side of the screen, which will take you to the Welcome page. Then " "click on \"Sign up Now\" on the right side of the page.     You will be asked to enter the access code listed below, as well as some personal information. Please follow the directions to create your username and password.     Your access code is: [email or web address]  Expires: 2019  6:01 PM     Your access code will  in 90 days. If you need help or a new code, please call your [place] clinic or [phone].        Care EveryWhere ID     This is your Care EveryWhere ID. This could be used by other organizations to access your [place] medical records  [id number]         Blood Pressure from Last 3 Encounters:   No data found for BP    Weight from Last 3 Encounters:   No data found for Wt              We Performed the Following     Neuromuscular Re-Education     Therapeutic Exercises        Primary Care Provider Office Phone # Fax #    Yohan Moss [phone]       [address] [address]        Equal Access to Services     [facility]: Hadii aad ku hadasho Soomaali, waaxda luqadaha, qaybta kaalmada adeegyada, waxay idiin hayaan madai amaya . So [facility] [phone].    ATENCIÓN: Si davidla halley, tiene a hernandez disposición servicios gratuitos de asistencia lingüística. Llame al [phone].    We comply with applicable federal civil rights laws and [place] laws. We do not discriminate on the basis of race, color, national origin, age, disability, sex, sexual orientation, or gender identity.            Thank you!     Thank you for choosing [facility]  for your care. Our goal is always to provide you with excellent care. Hearing back from our patients is one way we can continue to improve our services. Please take a few minutes to complete the written survey that you may receive in the mail after your visit with us. Thank you!             Your Updated Medication List - Protect others around you: Learn how to safely use, store and throw " away your medicines at www.disposemymeds.org.      Notice  As of 10/30/2018  4:31 PM    You have not been prescribed any medications.

## 2018-11-06 ENCOUNTER — THERAPY VISIT (OUTPATIENT)
Dept: PHYSICAL THERAPY | Facility: CLINIC | Age: 63
End: 2018-11-06
Payer: OTHER MISCELLANEOUS

## 2018-11-06 DIAGNOSIS — M25.512 SHOULDER PAIN, LEFT: ICD-10-CM

## 2018-11-06 PROCEDURE — 97112 NEUROMUSCULAR REEDUCATION: CPT | Mod: GP | Performed by: PHYSICAL THERAPIST

## 2018-11-06 PROCEDURE — 97110 THERAPEUTIC EXERCISES: CPT | Mod: GP | Performed by: PHYSICAL THERAPIST

## 2018-11-06 NOTE — PROGRESS NOTES
Subjective:  HPI                    Objective:  System    Physical Exam    General     ROS    Assessment/Plan:    SUBJECTIVE  Subjective changes as noted by pt:  Patient reports that he has improved by about 85% since starting therapy. He did some raking without any increase in pain over the weekend.       Current Pain level: 0/10   Changes in function:  Yes (See Goal flowsheet attached for changes in current functional level)     Adverse reaction to treatment or activity:  None    OBJECTIVE  Changes in objective findings:  Standing L shoulder AROM: WNL with end range ache on flexion, abduction, and IR/EXT. Pain and weakness with resisted abduction>>IR & ER>elbow flexion.         ASSESSMENT  Emiliano continues to require intervention to meet STG and LTG's: PT  Patient's symptoms are resolving.  Patient is progressing as expected.  Response to therapy has shown an improvement in  pain level, ROM , strength and function  Progress made towards STG/LTG?  Yes (See Goal flowsheet attached for updates on achievement of STG and LTG)    PLAN  Current treatment program is being advanced to more complex exercises.    PTA/ATC plan:  N/A    Please refer to the daily flowsheet for treatment today, total treatment time and time spent performing 1:1 timed codes.

## 2018-11-06 NOTE — MR AVS SNAPSHOT
"              After Visit Summary   11/6/2018    Emiliano Cotter    MRN: [id number]           Patient Information     Date Of Birth          1955        Visit Information        Provider Department      11/6/2018 8:10 AM Ekta Esquivel PT [facility]        Today's Diagnoses     Shoulder pain, left           Follow-ups after your visit        Your next 10 appointments already scheduled     Nov 12, 2018  7:00 AM CST   SHOAIB Extremity with Ekta Esquivel PT   [facility]    [address]  [facility] [address] [phone]              Who to contact     If you have questions or need follow up information about today's clinic visit or your schedule please contact [facility] directly at [phone].  Normal or non-critical lab and imaging results will be communicated to you by MyChart, letter or phone within 4 business days after the clinic has received the results. If you do not hear from us within 7 days, please contact the clinic through [employer] or phone. If you have a critical or abnormal lab result, we will notify you by phone as soon as possible.  Submit refill requests through [employer] or call your pharmacy and they will forward the refill request to us. Please allow 3 business days for your refill to be completed.          Additional Information About Your Visit        MyChart Information     Style[employer] lets you send messages to your doctor, view your test results, renew your prescriptions, schedule appointments and more. To sign up, go to [email or web address] . Click on \"Log in\" on the left side of the screen, which will take you to the Welcome page. Then click on \"Sign up Now\" on the right side of the page.     You will be asked to enter the access code listed below, as well as some personal information. Please follow the directions to create your username and " password.     Your access code is: [email or web address]  Expires: 2019  5:01 PM     Your access code will  in 90 days. If you need help or a new code, please call your [place] clinic or [phone].        Care EveryWhere ID     This is your Care EveryWhere ID. This could be used by other organizations to access your [place] medical records  [id number]         Blood Pressure from Last 3 Encounters:   No data found for BP    Weight from Last 3 Encounters:   No data found for Wt              We Performed the Following     Neuromuscular Re-Education     Therapeutic Exercises        Primary Care Provider Office Phone # Fax #    Yohan Moss [phone]       [address] [address]        Equal Access to Services     CONCEPCIÓN RIZVI : Hadii dennys sarah hadasho Sosongali, waaxda luqadaha, qaybta kaalmada adeegyada, waxay amberin hayjazieln madai amaya . So [facility] [phone].    ATENCIÓN: Si habla español, tiene a hernandez disposición servicios gratuitos de asistencia lingüística. Llame al [phone].    We comply with applicable federal civil rights laws and [place] laws. We do not discriminate on the basis of race, color, national origin, age, disability, sex, sexual orientation, or gender identity.            Thank you!     Thank you for choosing [facility]  for your care. Our goal is always to provide you with excellent care. Hearing back from our patients is one way we can continue to improve our services. Please take a few minutes to complete the written survey that you may receive in the mail after your visit with us. Thank you!             Your Updated Medication List - Protect others around you: Learn how to safely use, store and throw away your medicines at www.disposemymeds.org.      Notice  As of 2018  9:18 AM    You have not been prescribed any medications.

## 2019-02-24 PROBLEM — M25.512 SHOULDER PAIN, LEFT: Status: RESOLVED | Noted: 2018-10-16 | Resolved: 2019-02-24

## 2019-06-06 ENCOUNTER — THERAPY VISIT (OUTPATIENT)
Dept: PHYSICAL THERAPY | Facility: CLINIC | Age: 64
End: 2019-06-06
Payer: OTHER MISCELLANEOUS

## 2019-06-06 DIAGNOSIS — M25.512 SHOULDER PAIN, LEFT: ICD-10-CM

## 2019-06-06 PROCEDURE — 97110 THERAPEUTIC EXERCISES: CPT | Mod: GP | Performed by: PHYSICAL THERAPIST

## 2019-06-06 PROCEDURE — 97161 PT EVAL LOW COMPLEX 20 MIN: CPT | Mod: GP | Performed by: PHYSICAL THERAPIST

## 2019-06-06 NOTE — LETTER
"[facility]THLETIC [facility]  [address]  [facility] [address]  [phone]    2019    Re: Emiliano Cotter   :   1955  MRN:  [id number]   REFERRING PHYSICIAN:   Yohan Moss  [facility]  Date of Initial Evaluation: 2019  Visits:  Rxs Used: 1  Reason for Referral:  Shoulder pain, left    EVALUATION SUMMARY  [facility] Initial Evaluation  Subjective:  The history is provided by the patient. No  was used.   Emiliano Cotter is a 63 year old male with a left shoulder (right handed) condition.    Condition occurred: at work.  This is a new and recurrent condition  Patient reports that on 19, he was putting an \"O\" ring on a 4000# barrel and it started to shift and he tried to push it back with his left arm and had onset of left shoulder pain. He has had a MRI and he is not sure what it said. He has had problems a couple of the times in the past with the left shoulder with the most recent being last September and he felt great after therapy. He had had no pain until this most recent injury.  MD order from 19.   .    Patient reports pain:  Lateral, upper arm, scapular area and posterior.    Pain is described as sharp and aching and is intermittent and reported as 4/10 (up to 9/10 when bad).  Associated symptoms:  Loss of strength, loss of motion/stiffness, numbness and tingling (has occas N&T in left arm). Pain is the same all the time.  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  Since onset symptoms are unchanged.  Special tests:  MRI.      General health as reported by patient is good.  Pertinent medical history includes:  Asthma.  Medical allergies: no.  " Other surgeries include:  None reported.  Current medications:  Other (flowvent).  Current occupation is .  Patient is currently not working due to present treatment problem.  Primary job tasks include:  Lifting, repetitive tasks, other and prolonged standing (pushing/pulling, ).  Barriers include:  None as reported by the patient.  Red flags:  None as reported by the patient.    Objective:  System    Cervical/Thoracic Evaluation  AROM:  AROM Cervical:  Flexion:            WNL  Extension:       Mod loss and slight ache in L scapular region  Rotation:         Left: min to mod loss     Right: min loss  Side Bend:      Left: min loss and slight ache in left upper arm     Right:  Min loss  Headaches: none  Shoulder Evaluation:  ROM:  AROM:    Flexion:  Left:  125 with pain from 90 and above    Right:  150  Abduction:  Left: 112 with pain from 80 and above   Right:  145  Extension/Internal Rotation:  Left:  Sacrum with michael    Right:  T 9 SP      Strength:    Flexion: Left:4-/5    Pain: +    Right: 5/5      Pain:  -  Extension:  Left: 4+/5     Pain:+    Right: 5/5     Pain:-  Abduction:  Left: 3-/5   Pain:+++    Right: 5/5      Pain:-  Adduction:  Left: 5-/5     Pain:+    Right: 5/5      Pain:-  Internal Rotation:  Left:4+/5      Pain:+    Right: 5/5      Pain:-  External Rotation:   Left:4/5      Pain:+   Right:5/5      Pain:-      Horizontal Adduction:  Right:/5     Pain:-  Elbow Flexion:  Left:4+/5      Pain:+    Right:5/5      Pain:-  Elbow Extension:  Left:5-/5      Pain:+    Right:5/5     Pain:  Stability Testing:  normal  Special Tests:    Left shoulder positive for the following special tests:  Impingement    Mobility Tests:    Glenohumeral posterior left:  Hypomobile    Glenohumeral inferior left:  Hypomobile    Assessment/Plan:    Patient is a 63 year old male with left side shoulder complaints.    Patient has the following significant findings with corresponding treatment plan.                 Diagnosis 1:  Shoulder impingement  Pain -  hot/cold therapy, US, manual therapy, self management, education, directional preference exercise and home program  Decreased ROM/flexibility - manual therapy, therapeutic exercise and home program  Decreased joint mobility - manual therapy, therapeutic exercise and home program  Decreased strength - therapeutic exercise, therapeutic activities and home program  Decreased proprioception - neuro re-education, therapeutic activities and home program  Impaired muscle performance - neuro re-education and home program  Decreased function - therapeutic activities and home program  Therapy Evaluation Codes:   1) History comprised of:   Personal factors that impact the plan of care:      Past/current experiences.    Comorbidity factors that impact the plan of care are:      None.     Medications impacting care: None.  2) Examination of Body Systems comprised of:   Body structures and functions that impact the plan of care:      Shoulder.   Activity limitations that impact the plan of care are:      Bathing, Cooking, Driving, Dressing, Lifting, Working, Sleeping, Laying down and reaching.  3) Clinical presentation characteristics are:   Stable/Uncomplicated.  4) Decision-Making    Low complexity using standardized patient assessment instrument and/or measureable assessment of functional outcome.  Cumulative Therapy Evaluation is: Low complexity.  Previous and current functional limitations:  (See Goal Flow Sheet for this information)    Short term and Long term goals: (See Goal Flow Sheet for this information)   Communication ability:  Patient appears to be able to clearly communicate and understand verbal and written communication and follow directions correctly.  Treatment Explanation - The following has been discussed with the patient:   RX ordered/plan of care  Anticipated outcomes  Possible risks and side effects  This patient would benefit from PT intervention to resume  normal activities.   Rehab potential is good.  Frequency:  1 X week, once daily  Duration:  for 6 weeks  Discharge Plan:  Achieve all LTG.  Independent in home treatment program.  Reach maximal therapeutic benefit.  Please refer to the daily flowsheet for treatment today, total treatment time and time spent performing 1:1 timed codes.     Thank you for your referral.    INQUIRIES  Therapist: BHUMIKA ORTEZ PT  [facility]  [address]  [facility] [address]  Phone: [phone]  Fax: [phone]

## 2019-06-06 NOTE — PROGRESS NOTES
"[facility] Initial Evaluation  Subjective:  The history is provided by the patient. No  was used.   Emiliano Cotter is a 63 year old male with a left shoulder (right handed) condition.    Condition occurred: at work.  This is a new and recurrent condition  Patient reports that on 5-22-19, he was putting an \"O\" ring on a 4000# barrel and it started to shift and he tried to push it back with his left arm and had onset of left shoulder pain. He has had a MRI and he is not sure what it said. He has had problems a couple of the times in the past with the left shoulder with the most recent being last September and he felt great after therapy. He had had no pain until this most recent injury.  MD order from 5-30-19.   .    Patient reports pain:  Lateral, upper arm, scapular area and posterior.    Pain is described as sharp and aching and is intermittent and reported as 4/10 (up to 9/10 when bad).  Associated symptoms:  Loss of strength, loss of motion/stiffness, numbness and tingling (has occas N&T in left arm). Pain is the same all the time.  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  Since onset symptoms are unchanged.  Special tests:  MRI.      General health as reported by patient is good.  Pertinent medical history includes:  Asthma.  Medical allergies: no.  Other surgeries include:  None reported.  Current medications:  Other (flowvent).  Current occupation is .  Patient is currently not working due to present treatment problem.  Primary job tasks include:  Lifting, repetitive tasks, other and prolonged standing (pushing/pulling, ).    Barriers include:  None as reported by the patient.    Red flags:  None as reported by the " patient.                        Objective:  System              Cervical/Thoracic Evaluation    AROM:  AROM Cervical:    Flexion:            WNL  Extension:       Mod loss and slight ache in L scapular region  Rotation:         Left: min to mod loss     Right: min loss  Side Bend:      Left: min loss and slight ache in left upper arm     Right:  Min loss      Headaches: none                         Shoulder Evaluation:  ROM:  AROM:    Flexion:  Left:  125 with pain from 90 and above    Right:  150    Abduction:  Left: 112 with pain from 80 and above   Right:  145                  Extension/Internal Rotation:  Left:  Sacrum with michael    Right:  T 9 SP          Strength:    Flexion: Left:4-/5    Pain: +    Right: 5/5      Pain:  -  Extension:  Left: 4+/5     Pain:+    Right: 5/5     Pain:-  Abduction:  Left: 3-/5   Pain:+++    Right: 5/5      Pain:-  Adduction:  Left: 5-/5     Pain:+    Right: 5/5      Pain:-  Internal Rotation:  Left:4+/5      Pain:+    Right: 5/5      Pain:-  External Rotation:   Left:4/5      Pain:+   Right:5/5      Pain:-      Horizontal Adduction:  Right:/5     Pain:-  Elbow Flexion:  Left:4+/5      Pain:+    Right:5/5      Pain:-  Elbow Extension:  Left:5-/5      Pain:+    Right:5/5     Pain:  Stability Testing:  normal      Special Tests:    Left shoulder positive for the following special tests:  Impingement      Mobility Tests:      Glenohumeral posterior left:  Hypomobile    Glenohumeral inferior left:  Hypomobile                                             General     ROS    Assessment/Plan:    Patient is a 63 year old male with left side shoulder complaints.    Patient has the following significant findings with corresponding treatment plan.                Diagnosis 1:  Shoulder impingement  Pain -  hot/cold therapy, US, manual therapy, self management, education, directional preference exercise and home program  Decreased ROM/flexibility - manual therapy, therapeutic exercise and home  program  Decreased joint mobility - manual therapy, therapeutic exercise and home program  Decreased strength - therapeutic exercise, therapeutic activities and home program  Decreased proprioception - neuro re-education, therapeutic activities and home program  Impaired muscle performance - neuro re-education and home program  Decreased function - therapeutic activities and home program    Therapy Evaluation Codes:   1) History comprised of:   Personal factors that impact the plan of care:      Past/current experiences.    Comorbidity factors that impact the plan of care are:      None.     Medications impacting care: None.  2) Examination of Body Systems comprised of:   Body structures and functions that impact the plan of care:      Shoulder.   Activity limitations that impact the plan of care are:      Bathing, Cooking, Driving, Dressing, Lifting, Working, Sleeping, Laying down and reaching.  3) Clinical presentation characteristics are:   Stable/Uncomplicated.  4) Decision-Making    Low complexity using standardized patient assessment instrument and/or measureable assessment of functional outcome.  Cumulative Therapy Evaluation is: Low complexity.    Previous and current functional limitations:  (See Goal Flow Sheet for this information)    Short term and Long term goals: (See Goal Flow Sheet for this information)     Communication ability:  Patient appears to be able to clearly communicate and understand verbal and written communication and follow directions correctly.  Treatment Explanation - The following has been discussed with the patient:   RX ordered/plan of care  Anticipated outcomes  Possible risks and side effects  This patient would benefit from PT intervention to resume normal activities.   Rehab potential is good.    Frequency:  1 X week, once daily  Duration:  for 6 weeks  Discharge Plan:  Achieve all LTG.  Independent in home treatment program.  Reach maximal therapeutic benefit.    Please refer  to the daily flowsheet for treatment today, total treatment time and time spent performing 1:1 timed codes.

## 2019-06-13 ENCOUNTER — THERAPY VISIT (OUTPATIENT)
Dept: PHYSICAL THERAPY | Facility: CLINIC | Age: 64
End: 2019-06-13
Payer: OTHER MISCELLANEOUS

## 2019-06-13 DIAGNOSIS — M25.512 SHOULDER PAIN, LEFT: ICD-10-CM

## 2019-06-13 PROCEDURE — 97110 THERAPEUTIC EXERCISES: CPT | Mod: GP | Performed by: PHYSICAL THERAPIST

## 2019-06-13 PROCEDURE — 97140 MANUAL THERAPY 1/> REGIONS: CPT | Mod: GP | Performed by: PHYSICAL THERAPIST

## 2019-06-13 NOTE — PROGRESS NOTES
SUBJECTIVE  Subjective changes as noted by pt:  Patient reports that he returned to work yesterday, but is to do nothing at all with his left arm. He states that reaching up behind his back is better, but not reaching overhead.      Current pain level: 4/10     Changes in function:  Yes (See Goal flowsheet attached for changes in current functional level)     Adverse reaction to treatment or activity:  None    OBJECTIVE  Changes in objective findings:  Standing L shoulder AROM: flexion to 125 with pain and IR/EXT to L 5 SP with ache.         ASSESSMENT  Emiliano continues to require intervention to meet STG and LTG's: PT  Patient is progressing as expected.  Response to therapy has shown an improvement in  ROM   Progress made towards STG/LTG?  Yes (See Goal flowsheet attached for updates on achievement of STG and LTG)    PLAN  Current treatment program is being advanced to more complex exercises.  The following procedures have been added:  manual therapy    PTA/ATC plan:  N/A    Please refer to the daily flowsheet for treatment today, total treatment time and time spent performing 1:1 timed codes.

## 2019-06-27 ENCOUNTER — THERAPY VISIT (OUTPATIENT)
Dept: PHYSICAL THERAPY | Facility: CLINIC | Age: 64
End: 2019-06-27
Payer: OTHER MISCELLANEOUS

## 2019-06-27 DIAGNOSIS — M25.512 SHOULDER PAIN, LEFT: ICD-10-CM

## 2019-06-27 PROCEDURE — 97110 THERAPEUTIC EXERCISES: CPT | Mod: GP | Performed by: PHYSICAL THERAPIST

## 2019-06-27 PROCEDURE — 97112 NEUROMUSCULAR REEDUCATION: CPT | Mod: GP | Performed by: PHYSICAL THERAPIST

## 2019-06-27 NOTE — PROGRESS NOTES
SUBJECTIVE  Subjective changes as noted by pt:  Patient that he can now raise his arm higher in front of him and behind his back, but is having pain and difficulty lifting his arm out to the side. He did reflexively reach out to stop something heavy at work to prevent it from hitting a coworker and that made his shoulder worse. He tries very hard not to use his arm at work.          Current pain level: No change     Changes in function:  Yes (See Goal flowsheet attached for changes in current functional level)     Adverse reaction to treatment or activity:  None    OBJECTIVE  Changes in objective findings:  Standing L shoulder AROM: flexion to 155 with pain from 120 and above, abduction to 95 with pain, and IR/EXT to L2 SP with pain. Pain and weakness with resisted abduction>>ER.            ASSESSMENT  Emiliano continues to require intervention to meet STG and LTG's: PT  Slow progress noted.  Response to therapy has shown an improvement in  ROM   Progress made towards STG/LTG?  Yes (See Goal flowsheet attached for updates on achievement of STG and LTG)    PLAN  Current treatment program is being advanced to more complex exercises.    PTA/ATC plan:  N/A    Please refer to the daily flowsheet for treatment today, total treatment time and time spent performing 1:1 timed codes.

## 2019-07-02 ENCOUNTER — THERAPY VISIT (OUTPATIENT)
Dept: PHYSICAL THERAPY | Facility: CLINIC | Age: 64
End: 2019-07-02
Payer: OTHER MISCELLANEOUS

## 2019-07-02 DIAGNOSIS — M25.512 SHOULDER PAIN, LEFT: ICD-10-CM

## 2019-07-02 PROCEDURE — 97110 THERAPEUTIC EXERCISES: CPT | Mod: GP | Performed by: PHYSICAL THERAPIST

## 2019-07-02 PROCEDURE — 97112 NEUROMUSCULAR REEDUCATION: CPT | Mod: GP | Performed by: PHYSICAL THERAPIST

## 2019-07-02 NOTE — PROGRESS NOTES
Subjective:                        Objective:  System    Physical Exam    General     ROS    Assessment/Plan:    PROGRESS  REPORT    Progress reporting period is from 6-6-19 to 7-2-19.       SUBJECTIVE  Subjective changes noted by patient:  Patient reports that he is better overall. He can raise his arm straight in front of him and behind his back much better. Going straight out to the side is still harder and painful to do.         Current pain level is 2/10  .     Previous pain level was  4/10  .   Changes in function:  Yes (See Goal flowsheet attached for changes in current functional level)  Adverse reaction to treatment or activity: None    OBJECTIVE  Changes noted in objective findings:  Standing left shoulder AROM: flexion 135, abduction 119 with ache, and IR/EXT to T 10 SP with slight ache. Pain and weakness with resisted left shoulder abduction>>IR>ER.        ASSESSMENT/PLAN  Updated problem list and treatment plan: Diagnosis 1:  Shoulder impingement  Pain -  hot/cold therapy, manual therapy, self management, education, directional preference exercise and home program  Decreased ROM/flexibility - manual therapy, therapeutic exercise and home program  Decreased joint mobility - manual therapy, therapeutic exercise and home program  Decreased strength - therapeutic exercise, therapeutic activities and home program  Impaired muscle performance - neuro re-education and home program  Decreased function - therapeutic activities and home program  STG/LTGs have been met or progress has been made towards goals:  Yes (See Goal flow sheet completed today.)  Assessment of Progress: The patient's condition is improving.  The patient's condition has potential to improve.  Self Management Plans:  Patient has been instructed in a home treatment program.  Patient  has been instructed in self management of symptoms.    Emiliano continues to require the following intervention to meet STG and LTG's:  PT    Recommendations:  This  patient would benefit from continued therapy.     Frequency:  1 X week, once daily  Duration:  for 6 weeks        Please refer to the daily flowsheet for treatment today, total treatment time and time spent performing 1:1 timed codes.

## 2019-07-02 NOTE — LETTER
[facility] ATHLETIC [facility]  [address]  [facility] [address]  [phone]    2019    Re: Emiliano Cotter   :   1955  MRN:  [id number]   REFERRING PHYSICIAN:   Yohan Moss    [facility]    Date of Initial Evaluation:  2019  Visits:  Rxs Used: 4  Reason for Referral:  Shoulder pain, left    PROGRESS  REPORT  Progress reporting period is from 19 to 19.       SUBJECTIVE  Subjective changes noted by patient:  Patient reports that he is better overall. He can raise his arm straight in front of him and behind his back much better. Going straight out to the side is still harder and painful to do.  Current pain level is 2/10. Previous pain level was  4/10.  Changes in function:  Yes (See Goal flowsheet attached for changes in current functional level). Adverse reaction to treatment or activity: None    OBJECTIVE  Changes noted in objective findings:  Standing left shoulder AROM: flexion 135, abduction 119 with ache, and IR/EXT to T 10 SP with slight ache. Pain and weakness with resisted left shoulder abduction>>IR>ER.    ASSESSMENT/PLAN  Updated problem list and treatment plan: Diagnosis 1:  Shoulder impingement  Pain -  hot/cold therapy, manual therapy, self management, education, directional preference exercise and home program  Decreased ROM/flexibility - manual therapy, therapeutic exercise and home program  Decreased joint mobility - manual therapy, therapeutic exercise and home program  Decreased strength - therapeutic exercise, therapeutic activities and home program  Impaired muscle performance - neuro re-education and home program  Decreased function - therapeutic activities and home program  STG/LTGs have been met or progress has been made towards goals:  Yes (See Goal flow sheet completed today.)  Assessment of Progress: The patient's condition is improving.  The patient's condition has potential to improve.  Self  Management Plans:  Patient has been instructed in a home treatment program.  Patient  has been instructed in self management of symptoms.    Emiliano continues to require the following intervention to meet STG and LTG's:  PT    Recommendations:  This patient would benefit from continued therapy.       Emiliano Cotter   1955-[address]    Frequency:  1 X week, once daily  Duration:  for 6 weeks    Thank you for your referral.    INQUIRIES  Therapist: Ekta Esquivel, PT  [facility]  [address]  [facility] [address]  Phone: [phone]  Fax: [phone]

## 2019-07-11 ENCOUNTER — THERAPY VISIT (OUTPATIENT)
Dept: PHYSICAL THERAPY | Facility: CLINIC | Age: 64
End: 2019-07-11
Payer: OTHER MISCELLANEOUS

## 2019-07-11 DIAGNOSIS — M25.512 SHOULDER PAIN, LEFT: ICD-10-CM

## 2019-07-11 PROCEDURE — 97110 THERAPEUTIC EXERCISES: CPT | Mod: GP | Performed by: PHYSICAL THERAPIST

## 2019-07-11 PROCEDURE — 97140 MANUAL THERAPY 1/> REGIONS: CPT | Mod: GP | Performed by: PHYSICAL THERAPIST

## 2019-07-11 NOTE — PROGRESS NOTES
SUBJECTIVE  Subjective changes as noted by pt:  Patient reports that he worked yesterday with new restriction of no lifting over 5# and nothing over his head. He felt fine during the day of work, but woke up today with a significant increase in his shoulder pain. He has a decrease in his shoulder ROM today as well. He will be seeing an ortho now for further evaluation.       Current Pain level: 2/10(UP TO 7/10 WITH MOVEMENT)   Changes in function:  Yes (See Goal flowsheet attached for changes in current functional level)     Adverse reaction to treatment or activity:  None    OBJECTIVE  Changes in objective findings:  Standing L shoulder AROM: flex 85, abduction 75, an IR/EXT to L4-5 interspace. Supine passive L shoulder flexion 110.        ASSESSMENT  Emiliano continues to require intervention to meet STG and LTG's: PT  Patient has experienced an exacerbation of symptoms.  Response to therapy has shown a worsening of  pain level, ROM  and function  Progress made towards STG/LTG?  Yes (See Goal flowsheet attached for updates on achievement of STG and LTG)    PLAN  Continue current treatment plan until patient demonstrates readiness to progress to higher level exercises.    PTA/ATC plan:  N/A    Please refer to the daily flowsheet for treatment today, total treatment time and time spent performing 1:1 timed codes.

## 2019-07-16 ENCOUNTER — THERAPY VISIT (OUTPATIENT)
Dept: PHYSICAL THERAPY | Facility: CLINIC | Age: 64
End: 2019-07-16
Payer: OTHER MISCELLANEOUS

## 2019-07-16 DIAGNOSIS — M25.512 SHOULDER PAIN, LEFT: ICD-10-CM

## 2019-07-16 PROCEDURE — 97140 MANUAL THERAPY 1/> REGIONS: CPT | Mod: GP | Performed by: PHYSICAL THERAPIST

## 2019-07-16 PROCEDURE — 97110 THERAPEUTIC EXERCISES: CPT | Mod: GP | Performed by: PHYSICAL THERAPIST

## 2019-07-16 NOTE — PROGRESS NOTES
"Subjective:  HPI                    Objective:  System    Physical Exam    General     ROS    Assessment/Plan:    PROGRESS  REPORT    Progress reporting period is from *** to 7-16-19.       SUBJECTIVE  Subjective changes noted by patient:      Current pain level is {PAIN LEVEL (SCALE OF 10):771320} Current Pain level: 1/10.     Previous pain level was  {PAIN LEVEL (SCALE OF 10):846986}  .   Changes in function:  {Changes in Function:080000}  Adverse reaction to treatment or activity: {Adverse reaction to treatment or activity:510998::\"None\"}    OBJECTIVE  Changes noted in objective findings:  {Changes noted in objective findings:189403}        ASSESSMENT/PLAN  Updated problem list and treatment plan: {DIAGNOSIS/PLAN REHAB:853077}  STG/LTGs have been met or progress has been made towards goals:  {Goals met/progress made:103201::\"Yes (See Goal flow sheet completed today.)\"}  Assessment of Progress: {Assessment of progress:763382}  Self Management Plans:  {Self Management Plans:300391}  {Appropriateness of Rx:316612}  Emiliano continues to require the following intervention to meet STG and LTG's:  {INTERVENTION REHAB:174924}    Recommendations:  {RECOMMENDATIONS REHAB:784796}    Please refer to the daily flowsheet for treatment today, total treatment time and time spent performing 1:1 timed codes.          "

## 2019-07-16 NOTE — PROGRESS NOTES
SUBJECTIVE  Subjective changes as noted by pt:  Patient reports that having 4 days off work helped his shoulder. He worked yesterday ad today, but did not do much at all with the left arm. He can move his shoulder better now. He is seeing the orthopedist tomorrow.       Current Pain level: 1/10   Changes in function:  Yes (See Goal flowsheet attached for changes in current functional level)     Adverse reaction to treatment or activity:  None    OBJECTIVE  Changes in objective findings:  Standing left shoulder AROM: flexion 125, abduction 90, and IR/EXT to L 3 SP. Able to do stand flexion and sdly ER strengthening without pain.         ASSESSMENT  Emiliano continues to require intervention to meet STG and LTG's: PT  Patient's symptoms are resolving.  Response to therapy has shown an improvement in  pain level, ROM  and function  Progress made towards STG/LTG?  Yes (See Goal flowsheet attached for updates on achievement of STG and LTG)    PLAN  Current treatment program is being advanced to more complex exercises.    PTA/ATC plan:  N/A    Please refer to the daily flowsheet for treatment today, total treatment time and time spent performing 1:1 timed codes.

## 2019-09-10 PROBLEM — M25.512 SHOULDER PAIN, LEFT: Status: RESOLVED | Noted: 2019-06-06 | Resolved: 2019-09-10

## 2019-09-10 NOTE — PROGRESS NOTES
Discharge Note    Progress reporting period is from last progress note on 07/11/19 to Jul 16, 2019.    Emiliano failed to follow up and current status is unknown.  Please see information below for last relevant information on current status.  Patient seen for 6 visits.    SUBJECTIVE  Subjective changes noted by patient:     .  Current pain level is 1/10.     Previous pain level was   .   Changes in function:  Yes (See Goal flowsheet attached for changes in current functional level)  Adverse reaction to treatment or activity: None    OBJECTIVE  Changes noted in objective findings:       ASSESSMENT/PLAN  Diagnosis: L shoulder pain   Updated problem list and treatment plan:   Pain - HEP  Decreased ROM/flexibility - HEP  Decreased function - HEP  Decreased strength - HEP  Impaired muscle performance - HEP  STG/LTGs have been met or progress has been made towards goals:  Yes, please see goal flowsheet for most current information  Assessment of Progress: current status is unknown.    Last current status:     Self Management Plans:  HEP  I have re-evaluated this patient and find that the nature, scope, duration and intensity of the therapy is appropriate for the medical condition of the patient.  Emiliano continues to require the following intervention to meet STG and LTG's:  HEP.    Recommendations:  Discharge with current home program.  Patient to follow up with MD as needed.    Please refer to the daily flowsheet for treatment today, total treatment time and time spent performing 1:1 timed codes.  .

## 2019-10-02 ENCOUNTER — HEALTH MAINTENANCE LETTER (OUTPATIENT)
Age: 64
End: 2019-10-02

## 2021-01-15 ENCOUNTER — HEALTH MAINTENANCE LETTER (OUTPATIENT)
Age: 66
End: 2021-01-15

## 2021-09-04 ENCOUNTER — HEALTH MAINTENANCE LETTER (OUTPATIENT)
Age: 66
End: 2021-09-04

## 2022-02-19 ENCOUNTER — HEALTH MAINTENANCE LETTER (OUTPATIENT)
Age: 67
End: 2022-02-19

## 2022-10-22 ENCOUNTER — HEALTH MAINTENANCE LETTER (OUTPATIENT)
Age: 67
End: 2022-10-22

## 2022-12-10 ENCOUNTER — HEALTH MAINTENANCE LETTER (OUTPATIENT)
Age: 67
End: 2022-12-10

## 2024-01-14 ENCOUNTER — HEALTH MAINTENANCE LETTER (OUTPATIENT)
Age: 69
End: 2024-01-14

## 2025-03-28 NOTE — LETTER
[facility] PHYSICAL THERAPY  [address] [id number]  [place] [address] [phone]    2017    Re: Emiliano Cotter   :   1955  MRN:  [id number]   REFERRING PHYSICIAN:   Yohan Moss    [facility]    Date of Initial Evaluation:  2017  Visits:  Rxs Used: 4  Reason for Referral:  Acute pain of left shoulder    PROGRESS  REPORT    Progress reporting period is from 10-31-17 to 17.       SUBJECTIVE  Subjective changes noted by patient:  Patient reports 95% improvement since starting therapy. He has done some lifting of 30# from an upper shelf at work and has had no pain.        Current Pain level: 0/10.     Previous pain level was  2/10  .   Changes in function:  Yes (See Goal flowsheet attached for changes in current functional level)  Adverse reaction to treatment or activity: None    OBJECTIVE  Changes noted in objective findings:  Standing L shoulder AROM all WNL with end range tightness on full elevation and no pain with movements. Left shoulder resisted testing all strong and painfree, except with 5-/5 and very slight ache with resisted L shoulder abduction.       ASSESSMENT/PLAN  Updated problem list and treatment plan: Diagnosis 1:  L shoulder strain/impingement  Pain -  manual therapy, self management, education, directional preference exercise and home program  Decreased ROM/flexibility - manual therapy, therapeutic exercise and home program  Decreased strength - therapeutic exercise, therapeutic activities and home program  Impaired muscle performance - neuro re-education and home program  Decreased function - therapeutic activities and home program  STG/LTGs have been met or progress has been made towards goals:  Yes (See Goal flow sheet completed today.)  Assessment of Progress: The patient's condition is improving.  The patient has met all of their long term goals.  Self  Management Plans:  Patient is independent in a home treatment program.  Patient is independent in self management of symptoms.    Re: Emiliano Cotter   :   1955        Emiliano continues to require the following intervention to meet STG and LTG's:  PT intervention is no longer required to meet STG/LTG.    Recommendations:  This patient is ready to be discharged from therapy and continue their home treatment program.    Thank you for your referral.        INQUIRIES  Therapist: Ekta Esquivel, PT   [facility] PHYSICAL THERAP[facility]  [address] [place] [address]. [address]  [facility] [address]  Phone: [phone]  Fax: [phone]      Yes